# Patient Record
Sex: FEMALE | Employment: FULL TIME | URBAN - METROPOLITAN AREA
[De-identification: names, ages, dates, MRNs, and addresses within clinical notes are randomized per-mention and may not be internally consistent; named-entity substitution may affect disease eponyms.]

---

## 2024-01-18 DIAGNOSIS — Z00.00 HEALTH MAINTENANCE EXAMINATION: ICD-10-CM

## 2024-02-05 ENCOUNTER — APPOINTMENT (OUTPATIENT)
Dept: RADIOLOGY | Facility: HOSPITAL | Age: 39
End: 2024-02-05
Payer: COMMERCIAL

## 2024-02-05 ENCOUNTER — APPOINTMENT (OUTPATIENT)
Dept: PRIMARY CARE | Facility: CLINIC | Age: 39
End: 2024-02-05
Payer: COMMERCIAL

## 2024-02-05 ENCOUNTER — APPOINTMENT (OUTPATIENT)
Dept: VASCULAR MEDICINE | Facility: HOSPITAL | Age: 39
End: 2024-02-05
Payer: COMMERCIAL

## 2024-02-05 ENCOUNTER — APPOINTMENT (OUTPATIENT)
Dept: CARDIOLOGY | Facility: HOSPITAL | Age: 39
End: 2024-02-05
Payer: COMMERCIAL

## 2024-02-05 ENCOUNTER — APPOINTMENT (OUTPATIENT)
Dept: INTEGRATIVE MEDICINE | Facility: CLINIC | Age: 39
End: 2024-02-05
Payer: COMMERCIAL

## 2024-03-06 ENCOUNTER — NUTRITION (OUTPATIENT)
Dept: PRIMARY CARE | Facility: CLINIC | Age: 39
End: 2024-03-06
Payer: COMMERCIAL

## 2024-03-06 ENCOUNTER — HOSPITAL ENCOUNTER (OUTPATIENT)
Dept: VASCULAR MEDICINE | Facility: HOSPITAL | Age: 39
Discharge: HOME | End: 2024-03-06
Payer: COMMERCIAL

## 2024-03-06 ENCOUNTER — ALLIED HEALTH (OUTPATIENT)
Dept: INTEGRATIVE MEDICINE | Facility: CLINIC | Age: 39
End: 2024-03-06
Payer: COMMERCIAL

## 2024-03-06 ENCOUNTER — APPOINTMENT (OUTPATIENT)
Dept: RADIOLOGY | Facility: HOSPITAL | Age: 39
End: 2024-03-06
Payer: COMMERCIAL

## 2024-03-06 ENCOUNTER — HOSPITAL ENCOUNTER (OUTPATIENT)
Dept: CARDIOLOGY | Facility: HOSPITAL | Age: 39
Discharge: HOME | End: 2024-03-06
Payer: COMMERCIAL

## 2024-03-06 ENCOUNTER — OFFICE VISIT (OUTPATIENT)
Dept: PRIMARY CARE | Facility: CLINIC | Age: 39
End: 2024-03-06
Payer: COMMERCIAL

## 2024-03-06 VITALS — HEIGHT: 66 IN | WEIGHT: 141 LBS | BODY MASS INDEX: 22.66 KG/M2

## 2024-03-06 VITALS
OXYGEN SATURATION: 99 % | BODY MASS INDEX: 22.66 KG/M2 | HEART RATE: 64 BPM | HEIGHT: 66 IN | DIASTOLIC BLOOD PRESSURE: 66 MMHG | WEIGHT: 141 LBS | SYSTOLIC BLOOD PRESSURE: 104 MMHG

## 2024-03-06 DIAGNOSIS — Z00.00 HEALTHCARE MAINTENANCE: Primary | ICD-10-CM

## 2024-03-06 DIAGNOSIS — Z13.6 ENCOUNTER FOR SCREENING FOR CARDIOVASCULAR DISORDERS: ICD-10-CM

## 2024-03-06 DIAGNOSIS — Z00.00 HEALTH MAINTENANCE EXAMINATION: ICD-10-CM

## 2024-03-06 DIAGNOSIS — Z00.00 HEALTH MAINTENANCE EXAMINATION: Primary | ICD-10-CM

## 2024-03-06 LAB
25(OH)D3 SERPL-MCNC: 30 NG/ML (ref 30–100)
ALBUMIN SERPL BCP-MCNC: 4.4 G/DL (ref 3.4–5)
ALP SERPL-CCNC: 54 U/L (ref 33–110)
ALT SERPL W P-5'-P-CCNC: 9 U/L (ref 7–45)
ANION GAP SERPL CALC-SCNC: 13 MMOL/L (ref 10–20)
APPEARANCE UR: ABNORMAL
AST SERPL W P-5'-P-CCNC: 12 U/L (ref 9–39)
BACTERIA #/AREA URNS AUTO: ABNORMAL /HPF
BASOPHILS # BLD AUTO: 0.07 X10*3/UL (ref 0–0.1)
BASOPHILS NFR BLD AUTO: 0.9 %
BILIRUB SERPL-MCNC: 0.5 MG/DL (ref 0–1.2)
BILIRUB UR STRIP.AUTO-MCNC: NEGATIVE MG/DL
BUN SERPL-MCNC: 14 MG/DL (ref 6–23)
CALCIUM SERPL-MCNC: 8.6 MG/DL (ref 8.6–10.3)
CHLORIDE SERPL-SCNC: 104 MMOL/L (ref 98–107)
CHOLEST SERPL-MCNC: 168 MG/DL (ref 0–199)
CHOLESTEROL/HDL RATIO: 2.5
CO2 SERPL-SCNC: 24 MMOL/L (ref 21–32)
COLOR UR: YELLOW
CREAT SERPL-MCNC: 0.71 MG/DL (ref 0.5–1.05)
CRP SERPL HS-MCNC: 0.6 MG/L
EGFRCR SERPLBLD CKD-EPI 2021: >90 ML/MIN/1.73M*2
EOSINOPHIL # BLD AUTO: 0.48 X10*3/UL (ref 0–0.7)
EOSINOPHIL NFR BLD AUTO: 6.3 %
ERYTHROCYTE [DISTWIDTH] IN BLOOD BY AUTOMATED COUNT: 13.5 % (ref 11.5–14.5)
EST. AVERAGE GLUCOSE BLD GHB EST-MCNC: 94 MG/DL
FERRITIN SERPL-MCNC: 23 NG/ML (ref 8–150)
GLUCOSE SERPL-MCNC: 83 MG/DL (ref 74–99)
GLUCOSE UR STRIP.AUTO-MCNC: NEGATIVE MG/DL
HBA1C MFR BLD: 4.9 %
HCT VFR BLD AUTO: 42.7 % (ref 36–46)
HDLC SERPL-MCNC: 66.7 MG/DL
HGB BLD-MCNC: 14.2 G/DL (ref 12–16)
IMM GRANULOCYTES # BLD AUTO: 0.02 X10*3/UL (ref 0–0.7)
IMM GRANULOCYTES NFR BLD AUTO: 0.3 % (ref 0–0.9)
INSULIN P FAST SERPL-ACNC: 31 UIU/ML (ref 3–25)
IRON SATN MFR SERPL: 23 % (ref 25–45)
IRON SERPL-MCNC: 90 UG/DL (ref 35–150)
KETONES UR STRIP.AUTO-MCNC: NEGATIVE MG/DL
LDLC SERPL CALC-MCNC: 92 MG/DL
LEUKOCYTE ESTERASE UR QL STRIP.AUTO: NEGATIVE
LYMPHOCYTES # BLD AUTO: 2.39 X10*3/UL (ref 1.2–4.8)
LYMPHOCYTES NFR BLD AUTO: 31.6 %
MAGNESIUM SERPL-MCNC: 1.8 MG/DL (ref 1.6–2.4)
MCH RBC QN AUTO: 28.8 PG (ref 26–34)
MCHC RBC AUTO-ENTMCNC: 33.3 G/DL (ref 32–36)
MCV RBC AUTO: 87 FL (ref 80–100)
MONOCYTES # BLD AUTO: 0.37 X10*3/UL (ref 0.1–1)
MONOCYTES NFR BLD AUTO: 4.9 %
MUCOUS THREADS #/AREA URNS AUTO: ABNORMAL /LPF
NEUTROPHILS # BLD AUTO: 4.24 X10*3/UL (ref 1.2–7.7)
NEUTROPHILS NFR BLD AUTO: 56 %
NITRITE UR QL STRIP.AUTO: NEGATIVE
NON HDL CHOLESTEROL: 101 MG/DL (ref 0–149)
NRBC BLD-RTO: 0 /100 WBCS (ref 0–0)
PH UR STRIP.AUTO: 5 [PH]
PLATELET # BLD AUTO: 342 X10*3/UL (ref 150–450)
POC APPEARANCE, URINE: CLEAR
POC BILIRUBIN, URINE: NEGATIVE
POC BLOOD, URINE: ABNORMAL
POC COLOR, URINE: YELLOW
POC GLUCOSE, URINE: NEGATIVE MG/DL
POC KETONES, URINE: NEGATIVE MG/DL
POC LEUKOCYTES, URINE: NEGATIVE
POC NITRITE,URINE: NEGATIVE
POC PH, URINE: 6 PH
POC PROTEIN, URINE: NEGATIVE MG/DL
POC SPECIFIC GRAVITY, URINE: 1.01
POC UROBILINOGEN, URINE: 0.2 EU/DL
POTASSIUM SERPL-SCNC: 4.5 MMOL/L (ref 3.5–5.3)
PROT SERPL-MCNC: 7 G/DL (ref 6.4–8.2)
PROT UR STRIP.AUTO-MCNC: NEGATIVE MG/DL
RBC # BLD AUTO: 4.93 X10*6/UL (ref 4–5.2)
RBC # UR STRIP.AUTO: ABNORMAL /UL
RBC #/AREA URNS AUTO: ABNORMAL /HPF
SODIUM SERPL-SCNC: 136 MMOL/L (ref 136–145)
SP GR UR STRIP.AUTO: 1.01
SQUAMOUS #/AREA URNS AUTO: ABNORMAL /HPF
TIBC SERPL-MCNC: 396 UG/DL (ref 240–445)
TRIGL SERPL-MCNC: 46 MG/DL (ref 0–149)
TSH SERPL-ACNC: 1.4 MIU/L (ref 0.44–3.98)
UIBC SERPL-MCNC: 306 UG/DL (ref 110–370)
URATE SERPL-MCNC: 4.8 MG/DL (ref 2.3–6.7)
UROBILINOGEN UR STRIP.AUTO-MCNC: <2 MG/DL
VIT B12 SERPL-MCNC: 595 PG/ML (ref 211–911)
VLDL: 9 MG/DL (ref 0–40)
WBC # BLD AUTO: 7.6 X10*3/UL (ref 4.4–11.3)
WBC #/AREA URNS AUTO: ABNORMAL /HPF

## 2024-03-06 PROCEDURE — 93880 EXTRACRANIAL BILAT STUDY: CPT | Performed by: INTERNAL MEDICINE

## 2024-03-06 PROCEDURE — 83036 HEMOGLOBIN GLYCOSYLATED A1C: CPT

## 2024-03-06 PROCEDURE — BOMIX BODY MASS INDEX: Performed by: INTERNAL MEDICINE

## 2024-03-06 PROCEDURE — 82172 ASSAY OF APOLIPOPROTEIN: CPT

## 2024-03-06 PROCEDURE — 84443 ASSAY THYROID STIM HORMONE: CPT

## 2024-03-06 PROCEDURE — SMAR2 SMART-UHCSM: Performed by: PHYSICIAN ASSISTANT

## 2024-03-06 PROCEDURE — 1036F TOBACCO NON-USER: CPT | Performed by: INTERNAL MEDICINE

## 2024-03-06 PROCEDURE — 83525 ASSAY OF INSULIN: CPT

## 2024-03-06 PROCEDURE — 87086 URINE CULTURE/COLONY COUNT: CPT

## 2024-03-06 PROCEDURE — 84550 ASSAY OF BLOOD/URIC ACID: CPT

## 2024-03-06 PROCEDURE — 82607 VITAMIN B-12: CPT

## 2024-03-06 PROCEDURE — AUDIO AUDIO HEARING TEST: Performed by: INTERNAL MEDICINE

## 2024-03-06 PROCEDURE — 81001 URINALYSIS AUTO W/SCOPE: CPT

## 2024-03-06 PROCEDURE — 80061 LIPID PANEL: CPT

## 2024-03-06 PROCEDURE — 36415 COLL VENOUS BLD VENIPUNCTURE: CPT

## 2024-03-06 PROCEDURE — 83735 ASSAY OF MAGNESIUM: CPT

## 2024-03-06 PROCEDURE — 93017 CV STRESS TEST TRACING ONLY: CPT

## 2024-03-06 PROCEDURE — 80053 COMPREHEN METABOLIC PANEL: CPT

## 2024-03-06 PROCEDURE — 93978 VASCULAR STUDY: CPT | Performed by: INTERNAL MEDICINE

## 2024-03-06 PROCEDURE — 83550 IRON BINDING TEST: CPT

## 2024-03-06 PROCEDURE — NUTCO NUTRITION CONSULTATION: Performed by: DIETITIAN, REGISTERED

## 2024-03-06 PROCEDURE — 82728 ASSAY OF FERRITIN: CPT

## 2024-03-06 PROCEDURE — 93016 CV STRESS TEST SUPVJ ONLY: CPT | Performed by: INTERNAL MEDICINE

## 2024-03-06 PROCEDURE — 93018 CV STRESS TEST I&R ONLY: CPT | Performed by: INTERNAL MEDICINE

## 2024-03-06 PROCEDURE — 93978 VASCULAR STUDY: CPT

## 2024-03-06 PROCEDURE — BODTM BONE DENSITY TESTING/MEDISCAN: Performed by: INTERNAL MEDICINE

## 2024-03-06 PROCEDURE — 86141 C-REACTIVE PROTEIN HS: CPT

## 2024-03-06 PROCEDURE — 85025 COMPLETE CBC W/AUTO DIFF WBC: CPT

## 2024-03-06 PROCEDURE — 93880 EXTRACRANIAL BILAT STUDY: CPT

## 2024-03-06 PROCEDURE — 82306 VITAMIN D 25 HYDROXY: CPT

## 2024-03-06 PROCEDURE — 81002 URINALYSIS NONAUTO W/O SCOPE: CPT | Performed by: INTERNAL MEDICINE

## 2024-03-06 PROCEDURE — EXAM4 EXAM 4: Performed by: INTERNAL MEDICINE

## 2024-03-06 PROCEDURE — 83540 ASSAY OF IRON: CPT

## 2024-03-06 RX ORDER — LEVOTHYROXINE SODIUM 25 UG/1
25 TABLET ORAL DAILY
COMMUNITY

## 2024-03-06 NOTE — PROGRESS NOTES
39 y/o presents to [facility] for stress management and resilience training in coordination with  Executive Health. Lives with , [place], and two children (2, 4).    Duties/Schedule: / at [employer].  is also  and very demanding job. Works 40 hours. Hybrid, two days in office. PM- time with kids, make dinner. Weekends- kids' activities. No travel. Hobbies- (pre-kids) nature, yoga, reading, travel, Bible study, Yarsanism weekly.     Time for self-care: quality time kids; not much time to herself    Known stressors:   Poor sleep  Demanding job  Postpartum anxiety- worry about children and decisions related to them  Other long term health concerns    Stress in the body:   Mind racing  Sleep disruption     Sleep quality: Averages 5-6 hours per night. Sleep onset- quickly. Sleep maintenance- disrupted by 2 y/o, breastfeeding, co-sleeping. Prefers this and knows it is temporary.    - Caffeine- 6 oz latte, occ iced tea  - ETOH- rare  - Water- 40-50 oz  - Late night eating- rare    Current stress management strategies: prayer, nature, social connection     Discussed physiologic changes that occur with acute vs. chronic stress, the autonomic nervous system, evidence re: neuroplasticity of mindfulness, and different mindfulness practices. Also discussed lifestyle habits that support the Body's natural defense mechanisms.  - familiar with yoga     Practiced a mindful breathing meditation together- feels more at ease; prefers counting    Plan:   SEE PATIENT INSTRUCTIONS

## 2024-03-06 NOTE — PROGRESS NOTES
Executive Physical         Patient ID: Valerie Ramirez is a 38 y.o. female who presents for Executive Health examination    The following report is in reference to your examination which was held at [facility] on 03/08/24. First, let me state that it was a pleasure meeting with you and  we appreciate that you have chosen [facility] for your executive evaluation.    You  a very healthy 38-year-old woman.  Past medical history significant for mostly gynecologic issues more unexplained infertility issues than anything else years   it took 3 years to become pregnant  6 IUI procedures but then became pregnant on your own  Second pregnancy did not take as long perhaps about 1 year  Recent miscarriage at around 8 weeks  Gestational diabetes with both pregnancies requiring insulin with the second pregnancy  No hospitalizations other than gynecologic related hospitalizations  Subclinical hypothyroidism on low-dose Synthroid which was started with infertility issues        At the time of your evaluation your biggest health concerns were  With recent miscarriage  History of gestational diabetes and implications for health issues going forward  Stress  High heart rate on occasion        History reviewed. No pertinent past medical history.      There is no problem list on file for this patient.       History reviewed. No pertinent surgical history.     Family History   Problem Relation Name Age of Onset    No Known Problems Mother      No Known Problems Father      No Known Problems Sister      No Known Problems Sister      Hypertension Brother      Coronary artery disease Maternal Grandmother      Stroke Maternal Grandfather  62    Breast cancer Paternal Grandmother  90    Lung cancer Paternal Grandfather          smoker        Social History     Social History Narrative    Originally from [place] area.  Went to [employer] for undergrad and then went to law  "school    Moved to [place] about 8 years ago with her job as  for [employer]     to her  who is also an  and from [place] who also does corporate law    They have 2 children a son who is 3 a daughter who is 1    Her diet is pretty healthy    She does not exercise routinely but does have an active life    She has never been a smoker    Rare alcohol    Her sleep patterns are not great as her children do not sleep well and she knows she does not get enough sleep    Increased stress with job children etc. but feels she does okay        No Known Allergies       Current Outpatient Medications:     Synthroid 25 mcg tablet, Take 1 tablet (25 mcg) by mouth once daily., Disp: , Rfl:      Review of systems was negative other than that listed in present history    Visit Vitals  /66   Pulse 64   Ht 1.676 m (5' 6\")   Wt 64 kg (141 lb)   SpO2 99%   BMI 22.76 kg/m²   Smoking Status Never   BSA 1.73 m²        Physical Exam  \Physical examination  Reveals a well-developed young woman in no acute distress    appearance is age appropriate  HEENT exam  Extraocular motion is intact  Tympanic membranes and external auditory canals are normal  Oropharynx is normal  There is no cervical lymphadenopathy appreciated  The thyroid is within normal limits    Lungs    clear to auscultation and percussion    Cardiovascular   regular rate and rhythm  No murmurs rubs or gallops are appreciated    Breast examination   No dominant masses nipple discharge or axillary lymphadenopathy is appreciated    Abdomen   soft nontender bowel sounds are positive   there is no organomegaly noted      Periphery  Pulses are present without deficits noted  No peripheral edema is noted    Musculoskeletal  Gait is normal  Is no joint erythema or swelling noted  Range of motion is within normal limits  Strength is 5 of 5 without deficits noted    Dermatology  No concerning skin lesions are noted    Neurology  No deficits are " noted  Judgment appears appropriate  Mood and affect are appropriate       No results found for this or any previous visit (from the past 24 hour(s)).   Office Visit on 03/06/2024   Component Date Value Ref Range Status    WBC 03/06/2024 7.6  4.4 - 11.3 x10*3/uL Final    nRBC 03/06/2024 0.0  0.0 - 0.0 /100 WBCs Final    RBC 03/06/2024 4.93  4.00 - 5.20 x10*6/uL Final    Hemoglobin 03/06/2024 14.2  12.0 - 16.0 g/dL Final    Hematocrit 03/06/2024 42.7  36.0 - 46.0 % Final    MCV 03/06/2024 87  80 - 100 fL Final    MCH 03/06/2024 28.8  26.0 - 34.0 pg Final    MCHC 03/06/2024 33.3  32.0 - 36.0 g/dL Final    RDW 03/06/2024 13.5  11.5 - 14.5 % Final    Platelets 03/06/2024 342  150 - 450 x10*3/uL Final    Neutrophils % 03/06/2024 56.0  40.0 - 80.0 % Final    Immature Granulocytes %, Automated 03/06/2024 0.3  0.0 - 0.9 % Final    Immature Granulocyte Count (IG) includes promyelocytes, myelocytes and metamyelocytes but does not include bands. Percent differential counts (%) should be interpreted in the context of the absolute cell counts (cells/UL).    Lymphocytes % 03/06/2024 31.6  13.0 - 44.0 % Final    Monocytes % 03/06/2024 4.9  2.0 - 10.0 % Final    Eosinophils % 03/06/2024 6.3  0.0 - 6.0 % Final    Basophils % 03/06/2024 0.9  0.0 - 2.0 % Final    Neutrophils Absolute 03/06/2024 4.24  1.20 - 7.70 x10*3/uL Final    Percent differential counts (%) should be interpreted in the context of the absolute cell counts (cells/uL).    Immature Granulocytes Absolute, Au* 03/06/2024 0.02  0.00 - 0.70 x10*3/uL Final    Lymphocytes Absolute 03/06/2024 2.39  1.20 - 4.80 x10*3/uL Final    Monocytes Absolute 03/06/2024 0.37  0.10 - 1.00 x10*3/uL Final    Eosinophils Absolute 03/06/2024 0.48  0.00 - 0.70 x10*3/uL Final    Basophils Absolute 03/06/2024 0.07  0.00 - 0.10 x10*3/uL Final    Magnesium 03/06/2024 1.80  1.60 - 2.40 mg/dL Final    Thyroid Stimulating Hormone 03/06/2024 1.40  0.44 - 3.98 mIU/L Final    Uric Acid 03/06/2024 4.8   2.3 - 6.7 mg/dL Final    Venipuncture immediately after or during the administration of Metamizole may lead to falsely low results. Testing should be performed immediately  prior to Metamizole dosing.    Vitamin B12 03/06/2024 595  211 - 911 pg/mL Final    Vitamin D, 25-Hydroxy, Total 03/06/2024 30  30 - 100 ng/mL Final    Glucose 03/06/2024 83  74 - 99 mg/dL Final    Sodium 03/06/2024 136  136 - 145 mmol/L Final    Potassium 03/06/2024 4.5  3.5 - 5.3 mmol/L Final    Chloride 03/06/2024 104  98 - 107 mmol/L Final    Bicarbonate 03/06/2024 24  21 - 32 mmol/L Final    Anion Gap 03/06/2024 13  10 - 20 mmol/L Final    Urea Nitrogen 03/06/2024 14  6 - 23 mg/dL Final    Creatinine 03/06/2024 0.71  0.50 - 1.05 mg/dL Final    eGFR 03/06/2024 >90  >60 mL/min/1.73m*2 Final    Calculations of estimated GFR are performed using the 2021 CKD-EPI Study Refit equation without the race variable for the IDMS-Traceable creatinine methods.  https://jasn.asnjournals.org/content/early/2021/09/22/ASN.3800831208    Calcium 03/06/2024 8.6  8.6 - 10.3 mg/dL Final    Albumin 03/06/2024 4.4  3.4 - 5.0 g/dL Final    Alkaline Phosphatase 03/06/2024 54  33 - 110 U/L Final    Total Protein 03/06/2024 7.0  6.4 - 8.2 g/dL Final    AST 03/06/2024 12  9 - 39 U/L Final    Bilirubin, Total 03/06/2024 0.5  0.0 - 1.2 mg/dL Final    ALT 03/06/2024 9  7 - 45 U/L Final    Patients treated with Sulfasalazine may generate falsely decreased results for ALT.    CRP, High Sensitivity 03/06/2024 0.6  <1.0 mg/L Final    Ferritin 03/06/2024 23  8 - 150 ng/mL Final    Hemoglobin A1C 03/06/2024 4.9  see below % Final    Estimated Average Glucose 03/06/2024 94  Not Established mg/dL Final    Insulin, Fasting 03/06/2024 31 (H)  3 - 25 uIU/mL Final    Iron 03/06/2024 90  35 - 150 ug/dL Final    UIBC 03/06/2024 306  110 - 370 ug/dL Final    TIBC 03/06/2024 396  240 - 445 ug/dL Final    % Saturation 03/06/2024 23 (L)  25 - 45 % Final    Cholesterol 03/06/2024 168  0 - 199  mg/dL Final          Age      Desirable   Borderline High   High     0-19 Y     0 - 169       170 - 199     >/= 200    20-24 Y     0 - 189       190 - 224     >/= 225         >24 Y     0 - 199       200 - 239     >/= 240   **All ranges are based on fasting samples. Specific   therapeutic targets will vary based on patient-specific   cardiac risk.    Pediatric guidelines reference:Pediatrics 2011, 128(S5).Adult guidelines reference: NCEP ATPIII Guidelines,LINDA 2001, 258:2486-97    Venipuncture immediately after or during the administration of Metamizole may lead to falsely low results. Testing should be performed immediately prior to Metamizole dosing.    HDL-Cholesterol 03/06/2024 66.7  mg/dL Final      Age       Very Low   Low     Normal    High    0-19 Y    < 35      < 40     40-45     ----  20-24 Y    ----     < 40      >45      ----        >24 Y      ----     < 40     40-60      >60      Cholesterol/HDL Ratio 03/06/2024 2.5   Final      Ref Values  Desirable  < 3.4  High Risk  > 5.0    LDL Calculated 03/06/2024 92  <=99 mg/dL Final                                Near   Borderline      AGE      Desirable  Optimal    High     High     Very High     0-19 Y     0 - 109     ---    110-129   >/= 130     ----    20-24 Y     0 - 119     ---    120-159   >/= 160     ----      >24 Y     0 -  99   100-129  130-159   160-189     >/=190      VLDL 03/06/2024 9  0 - 40 mg/dL Final    Triglycerides 03/06/2024 46  0 - 149 mg/dL Final       Age         Desirable   Borderline High   High     Very High   0 D-90 D    19 - 174         ----         ----        ----  91 D- 9 Y     0 -  74        75 -  99     >/= 100      ----    10-19 Y     0 -  89        90 - 129     >/= 130      ----    20-24 Y     0 - 114       115 - 149     >/= 150      ----         >24 Y     0 - 149       150 - 199    200- 499    >/= 500    Venipuncture immediately after or during the administration of Metamizole may lead to falsely low results. Testing should be  performed immediately prior to Metamizole dosing.    Non HDL Cholesterol 03/06/2024 101  0 - 149 mg/dL Final          Age       Desirable   Borderline High   High     Very High     0-19 Y     0 - 119       120 - 144     >/= 145    >/= 160    20-24 Y     0 - 149       150 - 189     >/= 190      ----         >24 Y    30 mg/dL above LDL Cholesterol goal      Lipoprotein (a) 03/06/2024 <6  <=29 mg/dL Final    Performed By: [facility]  [address] [place] [id number]  : Jorge Alberto Nuñez MD, PhD  CLIA Number: [id number]    POC Color, Urine 03/06/2024 Yellow  Straw, Yellow, Light-Yellow Final    POC Appearance, Urine 03/06/2024 Clear  Clear Final    POC Glucose, Urine 03/06/2024 NEGATIVE  NEGATIVE mg/dl Final    POC Bilirubin, Urine 03/06/2024 NEGATIVE  NEGATIVE Final    POC Ketones, Urine 03/06/2024 NEGATIVE  NEGATIVE mg/dl Final    POC Specific Gravity, Urine 03/06/2024 1.015  1.005 - 1.035 Final    POC Blood, Urine 03/06/2024 TRACE-Lysed (A)  NEGATIVE Final    POC PH, Urine 03/06/2024 6.0  No Reference Range Established PH Final    POC Protein, Urine 03/06/2024 NEGATIVE  NEGATIVE, 30 (1+) mg/dl Final    POC Urobilinogen, Urine 03/06/2024 0.2  0.2, 1.0 EU/DL Final    Poc Nitrite, Urine 03/06/2024 NEGATIVE  NEGATIVE Final    POC Leukocytes, Urine 03/06/2024 NEGATIVE  NEGATIVE Final    Urine Culture 03/06/2024 No significant growth   Final    Color, Urine 03/06/2024 Yellow  Straw, Yellow Final    Appearance, Urine 03/06/2024 Hazy (N)  Clear Final    Specific Gravity, Urine 03/06/2024 1.015  1.005 - 1.035 Final    pH, Urine 03/06/2024 5.0  5.0, 5.5, 6.0, 6.5, 7.0, 7.5, 8.0 Final    Protein, Urine 03/06/2024 NEGATIVE  NEGATIVE mg/dL Final    Glucose, Urine 03/06/2024 NEGATIVE  NEGATIVE mg/dL Final    Blood, Urine 03/06/2024 SMALL (1+) (A)  NEGATIVE Final    Ketones, Urine 03/06/2024 NEGATIVE  NEGATIVE mg/dL Final    Bilirubin, Urine 03/06/2024 NEGATIVE  NEGATIVE Final    Urobilinogen,  Urine 03/06/2024 <2.0  <2.0 mg/dL Final    Nitrite, Urine 03/06/2024 NEGATIVE  NEGATIVE Final    Leukocyte Esterase, Urine 03/06/2024 NEGATIVE  NEGATIVE Final    WBC, Urine 03/06/2024 1-5  1-5, NONE /HPF Final    RBC, Urine 03/06/2024 NONE  NONE, 1-2, 3-5 /HPF Final    Squamous Epithelial Cells, Urine 03/06/2024 1-9 (SPARSE)  Reference range not established. /HPF Final    Bacteria, Urine 03/06/2024 1+ (A)  NONE SEEN /HPF Final    Mucus, Urine 03/06/2024 1+  Reference range not established. /LPF Final       No results found.     Bone Density  normal  Hearing screen normal             Vascular studies   Carotid doppler ultrasound findings are consistent with less than 50% narrowing of the right and left carotid artery arteries which is normal  There is no evidence for abdominal aortic aneurysm         Stress test normal        Assessment/Plan       Discussion/Summary  In summary     Notable lab work     Total cholesterol 168 desired range less than 200  HDL which is the good cholesterol  66.7  LDL    92 desired range less than 100  Triglycerides  46 with desired range less than 150    Percent saturation was slightly low at 23% showing perhaps a relatively low body store of iron however your actual iron level was normal and you are not anemic  Hemoglobin A1c was excellent at 4.9  Although it looks like fasting insulin level is high 31 (3-25)  this however was a normal  fasting insulin level and we know you were not fasting so I think it is reflective of that    The remainder of blood work was all acceptable              Recommendations     It was a pleasure to meet you.  I think you are extraordinarily healthy and doing a good job with your overall health care  I know one of the your biggest issues is your history of gestational diabetes and perhaps risk of diabetes later in life.  Continue to follow routinely with your endocrinologist as indicated  You may want to look into a book titled  Outlive by Samuel  Cayla    Increased routine regular exercise is recommended. American Cardiology Association recommended 100-120 mins weekly of aerobic exercise (exercise that increases your heart rate). In addition you should add resistance training (lifting weights/etc.).   The biggest challenge for you is finding the time to get this in with your busy schedule    Stress management follow guidelines set up by our stress management team              Health maintenance    Tdap or tetanus booster every 10 years  Shingrix or shingles vaccination at age 50.  This is a series of 2 vaccinations the second vaccination is given 2 to 6 months following the first  Pneumonia vaccination with Prevnar 20 at age 65  Colon cancer screening starting at age 45 colonoscopy or if no risks could then consider Cologuard as well    Women  Yearly mammograms starting at age 40  Pap smears every 3 to 5 years  until age 65      General Wellness Tips: Please continue with a balanced diet and a regular physical activity program for at least 150 minutes/week of moderate exercise and 30 minutes/week of resistance/weight training per week.  Try to get 6 to 8 hours of sleep per night.  Please download the calm or [employer] cheryl from the Cheryl Store to assist with stress and sleep management if necessary.      I hope you found your day to be worthwhile and informative and I look forward to our continued relationship.  In conclusion,  I wish to thank you for attending the  executive health program at [facility].  I wish you and your family a safe and healthy year.    Please email me at [email or web address] or call me at [phone] if you have any questions pertaining to this report or any other medical concerns.    In good health,    Марина Vincent        This note was partially generated using the Dragon voice recognition system.  There may be some incorrect wording ,grammar, spelling or punctuation errors that were not corrected prior to  committing the note to the medical record.

## 2024-03-06 NOTE — PROGRESS NOTES
"Nutrition Assessment     Reason for Visit: It was a pleasure meeting Ms. Ramirez today to discuss diet and nutrition as part of her initial executive physical.    Anthropometrics:  Anthropometrics  Height: 167.6 cm (5' 6\")  Weight: 64 kg (141 lb)  BMI (Calculated): 22.77       Food And Nutrient Intake:  Food and Nutrient History  Food and Nutrient History: She reports a history a hypoglycemia.  She had gestational diabetes with all of her pregnancies.  She reports eating 2 meals a day.  She does not typically eat breakfast.  She tends to feel nauseous in the morning.  She will eat something small such as a snack size Perfect bar.  When she is in the office 2-3 days a week she will get [employer] meals.  She is eating out 5-6 times a week.  She is eating fish once a week.  Fluid Intake: Water-40-50oz a day; coffee-6 oz latte with whole milk; iced tea-on occasion; alcohol-rare, maybe once a month  Food Allergy: None  Food Intolerance: None  GI Symptoms: constipation (In her early adulthood she experienced constipation and sometimes would only have 2 bowel movements a week.  Now she tends to be pretty regular with her bowel movements.)  Sleep Duration/Quality: 5-6 hrs disrupted (She has a 1 year old that is not a good sleeper)     Supplements: None    Food Recall  9:00am: whole milk latte, Mini Perfect Bar (120 calories, 6g protein)  12:00pm: restaurant-steak sandwich, french fries  6:00pm: fruit plate (berries, grapes, cantalope); cheese sandwich; 100 calorie bag of popcorn; Mini Perfect bar    9:00am: whole milk latte  12:00pm: Chipotle burrito made with fajita veggies, cheese, brown rice and chicken  6:00pm: Eggplant fries with tomato sauce, 1 cup pasta with olive oil    9:00am: raisin toast  11:00am: egg, yañez, and cheese on everything bagel  1:00pm: rice, chicken and broccoli in bone broth  6:00pm: 1 cup pasta with olive oil, handful of cantalope chunks      Physical Activities:  Physical Activity  Physical " "Activity History: Before she had kids she was regularly doing cardio (lower impact) and yoga  Type of Physical Activity: She is not doing any planned/structured exercise; she tries to stay active with her young kids (walks, lord, etc)      Energy Needs  Calculated Energy Needs Using Equations  Height: 167.6 cm (5' 6\")  Weight Used for Equation Calculations: 64 kg (141 lb)  [facility] Equation (Overweight or Obese Patients): 1341  Equation Chosen to Use by RD: [facility]  Activity Factor: 1.3  Total Energy Needs: 1746  Protein Needs: 112g/day (0.8g/lb)      Nutrition Diagnosis      Nutrition Diagnosis  Patient has Nutrition Diagnosis: Yes  Diagnosis Status (1): New  Nutrition Diagnosis 1: Inadequate protein intake  Related to (1): food- and nutrition-related knowledge deficit regarding appropriate protein intake  As Evidenced by (1): food recall showing she is not meeting the recommended 112g protein per day  Additional Nutrition Diagnosis: Diagnosis 2  Diagnosis Status (2): New  Nutrition Diagnosis 2: Inadequate fiber intake  Related to (2): food- and nutrition-related knowledge deficit regarding appropriate fiber intake  As Evidenced by (2): food recall showing she is not meeting the recommended 30g fiber per day    Nutrition Interventions/Recommendations   Food and Nutrition Delivery  Meals & Snacks: Fiber-modified diet, Protein-modified diet        Patient Instructions   1) To help create well balanced meals while being mindful of portion sizes, use the plate model for portioning out your meals.  Fill 1/2 of your plate with non-starchy vegetables, 1/4 of your plate with lean protein (~4-5oz per meal), and 1/4 of your plate with complex carbohydrates/whole grains.  Each meal should contain the following 3 components: protein + fiber + healthy fats.    2) Aim for 30g fiber daily.  One way to help you reach this goal is to include non-starchy vegetables with both lunch and dinner (at least 1-2 cups).  " Be sure to include a wide variety of colorful vegetables throughout the week.  Also, be sure to use 100% whole wheat/whole grain breads/pastas, brown/wild rice, quinoa, oats, beans, lentils, starchy vegetables-potatoes, sweet potatoes, corn, peas, winter squash and limit/avoid white, processed carbohydrates (white bread, white pasta, white rice, etc).    3) Choose 3 out of 5 of the following daily to help you reach your daily fiber goals:  -1 cup berries (4-5g fiber)  -1/2 cup beans (7g fiber)  -1/4 cup nuts (5g fiber)  -1/3 avocado (4g fiber)  -3 cups greens (5g fiber)    4) Ensure you are getting adequate amounts of protein consistently throughout the day.  Your daily protein goal is 112g.  Aim for 30-40g per meal and include 10-15g protein at snacks.    5) Begin to incorporate breakfast regularly which will help with hypoglycemic episodes as well as nausea.  Options for increasing protein and fiber at breakfast:  -[employer] Pro Elite pre-made protein drink (32g protein per bottle) + fruit + nuts  -protein bars such as Aloha or No Cow  -1 cup cottage cheese (25g protein) + fruit  -Greek yogurt + collagen powder + high fiber cereal + nuts/seeds  -2-3 eggs + chicken sausage    6) Recommended supplements:  -Prenatal multivitamin  -Vitamin D3, 2000-4000IUs/day.  Take with food.  -Omega-3 supplement, [employer]atur[employer] Pro Omega 2000, 2 capsules a day  -Pure Encapsulations magnesium citrate (to help alleviate constipation when it occurs).  Start with 2 capsules and can titrate up to 3-4 capsules at bedtime    Nutrition Monitoring and Evaluation   Food/Nutrient Related History Monitoring  Monitoring and Evaluation Plan: Meal/snack pattern, Protein intake, Fiber intake  Meal/Snack Pattern: Estimated meal and snack pattern  Criteria: Eat 3 meals a day (incorporate breakfast consistently)  Estimated protein intake: Estimated protein intake  Criteria: 112g protein per day  Estimated fiber intake: Estimated fiber intake  Criteria:  30g fiber per day

## 2024-03-06 NOTE — PATIENT INSTRUCTIONS
1) To help create well balanced meals while being mindful of portion sizes, use the plate model for portioning out your meals.  Fill 1/2 of your plate with non-starchy vegetables, 1/4 of your plate with lean protein (~4-5oz per meal), and 1/4 of your plate with complex carbohydrates/whole grains.  Each meal should contain the following 3 components: protein + fiber + healthy fats.    2) Aim for 30g fiber daily.  One way to help you reach this goal is to include non-starchy vegetables with both lunch and dinner (at least 1-2 cups).  Be sure to include a wide variety of colorful vegetables throughout the week.  Also, be sure to use 100% whole wheat/whole grain breads/pastas, brown/wild rice, quinoa, oats, beans, lentils, starchy vegetables-potatoes, sweet potatoes, corn, peas, winter squash and limit/avoid white, processed carbohydrates (white bread, white pasta, white rice, etc).    3) Choose 3 out of 5 of the following daily to help you reach your daily fiber goals:  -1 cup berries (4-5g fiber)  -1/2 cup beans (7g fiber)  -1/4 cup nuts (5g fiber)  -1/3 avocado (4g fiber)  -3 cups greens (5g fiber)    4) Ensure you are getting adequate amounts of protein consistently throughout the day.  Your daily protein goal is 112g.  Aim for 30-40g per meal and include 10-15g protein at snacks.    5) Begin to incorporate breakfast regularly which will help with hypoglycemic episodes as well as nausea.  Options for increasing protein and fiber at breakfast:  -OWYN Pro Elite pre-made protein drink (32g protein per bottle) + fruit + nuts  -protein bars such as Aloha or No Cow  -1 cup cottage cheese (25g protein) + fruit  -Greek yogurt + collagen powder + high fiber cereal + nuts/seeds  -2-3 eggs + chicken sausage    6) Recommended supplements:  -Prenatal multivitamin  -Vitamin D3, 2000-4000IUs/day.  Take with food.  -Omega-3 supplement, Nordic Naturals Pro Omega 2000, 2 capsules a day  -Pure Encapsulations magnesium citrate (to help  alleviate constipation when it occurs).  Start with 2 capsules and can titrate up to 3-4 capsules at bedtime

## 2024-03-07 LAB — BACTERIA UR CULT: NORMAL

## 2024-03-07 NOTE — PATIENT INSTRUCTIONS
Stress management:  Spend 10 intentional minutes in nature (phone free!). While outside, name:  5 things you see  4 things you hear  3 things you smell  2 things you feel  1 thing you taste  Use mindful breathing before lunch daily  4-7-8 breath  Affirmations  Gratitude practice  State or write down three things that made you feel grateful throughout the day  Consider using a Hoberman's Sphere to teach your kids breath work  https://www.SchemaLogicube.com/watch?v=CJO0Kt8nsxY&t=6s   Supplements: (account made on Fullscript)  Prenatals- We- or Ritual are good brands  To reduce anxiety, increase intake or supplement with:  Magnesium  Pure Encapsulations Magnesium glycinate 120 mg nightly  Magnesium-rich foods: beans, nuts, seeds, avocado  Omega 3  Pure Encapsulations EPA/DHA Essentials 2 capsules daily  Omega 3 Foods: wild caught sockeye salmon, sardines, mackerel, sandy seeds, ground flax seed, walnuts, pecans  Resources:  Sleep hygiene and Mindfulness handouts  Fertility podcast- https://Neighborland/blog//podcast-ep862/   Endocrine disrupting chemicals- https://www.endocrine.org/-/media/endocrine/files/patient-engagement/hormones-and-series/hormones_and_edcs_what_you_need_to_know.pdf   Water filter- https://Zetta.net/products/the-Kasenna-showerhead?xpombiz=75249287041737

## 2024-03-08 LAB — LPA SERPL-MCNC: <6 MG/DL

## 2025-03-17 NOTE — PROGRESS NOTES
Executive Physical         Patient ID: Valerie Ramirez is a 40 y.o. female who presents for Executive Health examination    The following report is in reference to your examination which was held at [facility] on 03/21/25. First, let me state that it was a pleasure meeting with you and  we appreciate that you have chosen [facility] for your executive evaluation.    Past medical history significant for mostly gynecologic issues more unexplained infertility issues than anything else years   it took 3 years to become pregnant  6 IUI procedures but then became pregnant on your own  Second pregnancy did not take as long perhaps about 1 year   2 miscarriages at around 8 weeks in  2023 and 2024 (this one was with twins)  He would like to conceive again and currently they are not using any contraception she thinks after the age of 41 she may consider contraception  These miscarriages have been very disheartening for her she is seeing a counselor  Gestational diabetes with both pregnancies requiring insulin with the second pregnancy  No hospitalizations other than gynecologic related hospitalizations  Subclinical hypothyroidism on low-dose Synthroid which was started with infertility issues  Sciatica she has been bothered with this for some time seems to be worse with sitting especially driving she is not really doing any stretching I has been many years but is little worse right now      He feels they have had a lot of viral illnesses this winter and in the last month or 2 she has had some strange feeling of feel like her face is warm frequently and then will feel chilled as well not a lot of other respiratory symptoms  Her sleep patterns remain pretty poor as her daughter really does not sleep well at all    At the time of your evaluation your biggest health concerns were  Sciatica  Strange feeling of heat cold intolerance        No past medical history on file.   "    There is no problem list on file for this patient.       No past surgical history on file.     Family History   Problem Relation Name Age of Onset    No Known Problems Mother      Hypertension Father      Hyperlipidemia Father      No Known Problems Sister      No Known Problems Sister      Hypertension Brother      Coronary artery disease Maternal Grandmother      Stroke Maternal Grandfather  62    Breast cancer Paternal Grandmother  90    Lung cancer Paternal Grandfather          smoker        Social History     Social History Narrative    Originally from [place] area.  Went to [employer] for undergrad and then went to [employer]    Moved to [place] about  2016 vago with her job as  for [employer]     to her  who is also an  and from [place] who also does corporate law    They have 2 children a son who is 4 a daughter who is 2     Her diet is pretty healthy    She does not exercise routinely but does have an active life    She has never been a smoker    Rare alcohol    Her sleep patterns are not great as her children do not sleep well and she knows she does not get enough sleep        Increased stress with job children etc. but feels she does okay a        No Known Allergies       Current Outpatient Medications:     Synthroid 25 mcg tablet, Take 1 tablet (25 mcg) by mouth once daily., Disp: , Rfl:      Review of systems was negative other than that listed in present history    Visit Vitals  /68   Pulse 88   Ht 1.664 m (5' 5.5\")   Wt 61.2 kg (135 lb)   SpO2 99%   BMI 22.12 kg/m²   Smoking Status Never   BSA 1.68 m²    InBody scan  Weight 137.4  BMI 22.5  Percent body fat 26.9  Visceral fat 7    Physical Exam  Physical examination  Reveals a well-developed woman age-appropriate she is thin in no acute distress    appearance is age-appropriate she is thin  HEENT exam  Extraocular motion is intact  Tympanic membranes and external auditory " canals are normal  Oropharynx is normal  There is no cervical lymphadenopathy appreciated  The thyroid is within normal limits    Lungs    clear to auscultation and percussion    Cardiovascular   regular rate and rhythm  No murmurs rubs or gallops are appreciated    Breast examination   No dominant masses nipple discharge or axillary lymphadenopathy is appreciated    Abdomen   soft nontender bowel sounds are positive   there is no organomegaly noted      Periphery  Pulses are present without deficits noted  No peripheral edema is noted    Musculoskeletal  Gait is normal  Is no joint erythema or swelling noted  Range of motion is within normal limits  Strength is 5 of 5 without deficits noted    Dermatology  No concerning skin lesions are noted    Neurology  No deficits are noted  Judgment appears appropriate  Mood and affect are appropriate         No results found for this or any previous visit (from the past 24 hours).     Nutrition on 03/19/2025   Component Date Value Ref Range Status    POC Color, Urine 03/19/2025 Yellow  Straw, Yellow, Light-Yellow Final    POC Appearance, Urine 03/19/2025 Clear  Clear Final    POC Glucose, Urine 03/19/2025 NEGATIVE  NEGATIVE mg/dl Final    POC Bilirubin, Urine 03/19/2025 NEGATIVE  NEGATIVE Final    POC Ketones, Urine 03/19/2025 NEGATIVE  NEGATIVE mg/dl Final    POC Specific Gravity, Urine 03/19/2025 <=1.005  1.005 - 1.035 Final    POC Blood, Urine 03/19/2025 NEGATIVE  NEGATIVE Final    POC PH, Urine 03/19/2025 6.5  No Reference Range Established PH Final    POC Protein, Urine 03/19/2025 NEGATIVE  NEGATIVE mg/dl Final    POC Urobilinogen, Urine 03/19/2025 0.2  0.2, 1.0 EU/DL Final    Poc Nitrite, Urine 03/19/2025 NEGATIVE  NEGATIVE Final    POC Leukocytes, Urine 03/19/2025 NEGATIVE  NEGATIVE Final   Office Visit on 03/19/2025   Component Date Value Ref Range Status    WBC 03/19/2025 6.4  4.4 - 11.3 x10*3/uL Final    nRBC 03/19/2025 0.0  0.0 - 0.0 /100 WBCs Final    RBC  03/19/2025 4.86  4.00 - 5.20 x10*6/uL Final    Hemoglobin 03/19/2025 14.1  12.0 - 16.0 g/dL Final    Hematocrit 03/19/2025 41.1  36.0 - 46.0 % Final    MCV 03/19/2025 85  80 - 100 fL Final    MCH 03/19/2025 29.0  26.0 - 34.0 pg Final    MCHC 03/19/2025 34.3  32.0 - 36.0 g/dL Final    RDW 03/19/2025 13.8  11.5 - 14.5 % Final    Platelets 03/19/2025 316  150 - 450 x10*3/uL Final    Neutrophils % 03/19/2025 57.5  40.0 - 80.0 % Final    Immature Granulocytes %, Automated 03/19/2025 0.3  0.0 - 0.9 % Final    Immature Granulocyte Count (IG) includes promyelocytes, myelocytes and metamyelocytes but does not include bands. Percent differential counts (%) should be interpreted in the context of the absolute cell counts (cells/UL).    Lymphocytes % 03/19/2025 27.5  13.0 - 44.0 % Final    Monocytes % 03/19/2025 5.3  2.0 - 10.0 % Final    Eosinophils % 03/19/2025 8.3  0.0 - 6.0 % Final    Basophils % 03/19/2025 1.1  0.0 - 2.0 % Final    Neutrophils Absolute 03/19/2025 3.68  1.20 - 7.70 x10*3/uL Final    Percent differential counts (%) should be interpreted in the context of the absolute cell counts (cells/uL).    Immature Granulocytes Absolute, Au* 03/19/2025 0.02  0.00 - 0.70 x10*3/uL Final    Lymphocytes Absolute 03/19/2025 1.76  1.20 - 4.80 x10*3/uL Final    Monocytes Absolute 03/19/2025 0.34  0.10 - 1.00 x10*3/uL Final    Eosinophils Absolute 03/19/2025 0.53  0.00 - 0.70 x10*3/uL Final    Basophils Absolute 03/19/2025 0.07  0.00 - 0.10 x10*3/uL Final    Magnesium 03/19/2025 2.01  1.60 - 2.40 mg/dL Final    Thyroid Stimulating Hormone 03/19/2025 2.63  0.44 - 3.98 mIU/L Final    Uric Acid 03/19/2025 4.1  2.3 - 6.7 mg/dL Final    Venipuncture immediately after or during the administration of Metamizole may lead to falsely low results. Testing should be performed immediately  prior to Metamizole dosing.    Vitamin B12 03/19/2025 821  211 - 911 pg/mL Final    Vitamin D, 25-Hydroxy, Total 03/19/2025 32  30 - 100 ng/mL Final     Glucose 03/19/2025 80  74 - 99 mg/dL Final    Sodium 03/19/2025 137  136 - 145 mmol/L Final    Potassium 03/19/2025 4.4  3.5 - 5.3 mmol/L Final    Chloride 03/19/2025 103  98 - 107 mmol/L Final    Bicarbonate 03/19/2025 24  21 - 32 mmol/L Final    Anion Gap 03/19/2025 14  10 - 20 mmol/L Final    Urea Nitrogen 03/19/2025 11  6 - 23 mg/dL Final    Creatinine 03/19/2025 0.74  0.50 - 1.05 mg/dL Final    eGFR 03/19/2025 >90  >60 mL/min/1.73m*2 Final    Calculations of estimated GFR are performed using the 2021 CKD-EPI Study Refit equation without the race variable for the IDMS-Traceable creatinine methods.  https://jasn.asnjournals.org/content/early/2021/09/22/ASN.9777446367    Calcium 03/19/2025 9.2  8.6 - 10.3 mg/dL Final    Albumin 03/19/2025 4.6  3.4 - 5.0 g/dL Final    Alkaline Phosphatase 03/19/2025 47  33 - 110 U/L Final    Total Protein 03/19/2025 7.7  6.4 - 8.2 g/dL Final    AST 03/19/2025 12  9 - 39 U/L Final    Bilirubin, Total 03/19/2025 0.5  0.0 - 1.2 mg/dL Final    ALT 03/19/2025 8  7 - 45 U/L Final    Patients treated with Sulfasalazine may generate falsely decreased results for ALT.    CRP, High Sensitivity 03/19/2025 1.0 (H)  <1.0 mg/L Final    Ferritin 03/19/2025 29  8 - 150 ng/mL Final    Hemoglobin A1C 03/19/2025 4.8  See comment % Final    Estimated Average Glucose 03/19/2025 91  Not Established mg/dL Final    Insulin, Fasting 03/19/2025 7  3 - 25 uIU/mL Final    Iron 03/19/2025 63  35 - 150 ug/dL Final    UIBC 03/19/2025 306  110 - 370 ug/dL Final    TIBC 03/19/2025 369  240 - 445 ug/dL Final    % Saturation 03/19/2025 17 (L)  25 - 45 % Final    Cholesterol 03/19/2025 163  0 - 199 mg/dL Final          Age      Desirable   Borderline High   High     0-19 Y     0 - 169       170 - 199     >/= 200    20-24 Y     0 - 189       190 - 224     >/= 225         >24 Y     0 - 199       200 - 239     >/= 240   **All ranges are based on fasting samples. Specific   therapeutic targets will vary based on  patient-specific   cardiac risk.    Pediatric guidelines reference:Pediatrics 2011, 128(S5).Adult guidelines reference: NCEP ATPIII Guidelines,LINDA 2001, 258:2486-97    Venipuncture immediately after or during the administration of Metamizole may lead to falsely low results. Testing should be performed immediately prior to Metamizole dosing.    HDL-Cholesterol 03/19/2025 65.8  mg/dL Final      Age       Very Low   Low     Normal    High    0-19 Y    < 35      < 40     40-45     ----  20-24 Y    ----     < 40      >45      ----        >24 Y      ----     < 40     40-60      >60      Cholesterol/HDL Ratio 03/19/2025 2.5   Final      Ref Values  Desirable  < 3.4  High Risk  > 5.0    LDL Calculated 03/19/2025 88  <=99 mg/dL Final                                Near   Borderline      AGE      Desirable  Optimal    High     High     Very High     0-19 Y     0 - 109     ---    110-129   >/= 130     ----    20-24 Y     0 - 119     ---    120-159   >/= 160     ----      >24 Y     0 -  99   100-129  130-159   160-189     >/=190      VLDL 03/19/2025 9  0 - 40 mg/dL Final    Triglycerides 03/19/2025 47  0 - 149 mg/dL Final    Age              Desirable        Borderline         High        Very High  SEX:B           mg/dL             mg/dL               mg/dL      mg/dL  <=14D                       ----               ----        ----  15D-365D                    ----               ----        ----  1Y-9Y           0-74               75-99             >=100       ----  10Y-19Y        0-89                            >=130       ----  20Y-24Y        0-114             115-149             >=150      ----  >= 25Y         0-149             150-199             200-499    >=500      Venipuncture immediately after or during the administration of Metamizole may lead to falsely low results. Testing should be performed immediately prior to Metamizole dosing.    Non HDL Cholesterol 03/19/2025 97  0 - 149 mg/dL Final           Age       Desirable   Borderline High   High     Very High     0-19 Y     0 - 119       120 - 144     >/= 145    >/= 160    20-24 Y     0 - 149       150 - 189     >/= 190      ----         >24 Y    30 mg/dL above LDL Cholesterol goal                                   Assessment/Plan       Discussion/Summary  In summary     Notable lab work     Total cholesterol  163 desired range less than 200  HDL which is the good cholesterol  65.8  LDL    88  desired range less than 100  Triglycerides  47 with desired range less than 150    The remainder of blood work was all acceptable        Ancillary studies   Test is normal  Bone density is normal  Hearing screen is normal      Recommendations     It was wonderful to see you again  I do think you are very healthy and her engaging in healthy lifestyle and choices.  I know you are very active but I do recommend more concentrated routine regular exercise  Routine regular exercise is recommended. American Cardiology Association recommended 100-120 mins weekly of aerobic exercise (exercise that increases your heart rate). In addition you should add resistance training (lifting weights/etc.).    From the chronic back pain standpoint/sciatica I do recommend physical therapy or some easy stretching exercises to do daily as I know you are aware back pain tends to be more of a chronic issue  Consider screening mammogram as recommendations are to start at age 40 and since you are trying to conceive it could be a while before you get your baseline mammogram.  Otherwise I think stress management is probably her biggest health issue.  Continue with counseling.  Continue to use stress reduction techniques as well    I look forward to our  continued relationship            Health maintenance    Tdap or tetanus booster every 10 years  Shingrix or shingles vaccination at age 50.  This is a series of 2 vaccinations the second vaccination is given 2 to 6 months following the first  Pneumonia  vaccination with Prevnar 20 at age 50   Colon cancer screening starting at age 45 colonoscopy or if no risks could then consider Cologuard as well    Women  Yearly mammograms starting at age 40  Pap smears every 3 to 5 years  until age 65      General Wellness Tips: Please continue with a balanced diet and a regular physical activity program for at least 150 minutes/week of moderate exercise and 30 minutes/week of resistance/weight training per week.  Try to get 6 to 8 hours of sleep per night.  Please download the calm or headspace cheryl from the Cheryl Store to assist with stress and sleep management if necessary.      I hope you found your day to be worthwhile and informative and I look forward to our continued relationship.  In conclusion,  I wish to thank you for attending the  executive health program at [facility].  I wish you and your family a safe and healthy year.    Please email me at [email or web address] or call me at [phone] if you have any questions pertaining to this report or any other medical concerns.    In good health,    Марина Vincent        This note was partially generated using the Dragon voice recognition system.  There may be some incorrect wording ,grammar, spelling or punctuation errors that were not corrected prior to committing the note to the medical record. Dictation #1  MRN:[id number]  [facility]:[id number]

## 2025-03-19 ENCOUNTER — NUTRITION (OUTPATIENT)
Dept: PRIMARY CARE | Facility: CLINIC | Age: 40
End: 2025-03-19

## 2025-03-19 ENCOUNTER — HOSPITAL ENCOUNTER (OUTPATIENT)
Dept: CARDIOLOGY | Facility: HOSPITAL | Age: 40
Discharge: HOME | End: 2025-03-19
Payer: COMMERCIAL

## 2025-03-19 ENCOUNTER — HOSPITAL ENCOUNTER (OUTPATIENT)
Dept: RADIOLOGY | Facility: HOSPITAL | Age: 40
Discharge: HOME | End: 2025-03-19
Payer: COMMERCIAL

## 2025-03-19 ENCOUNTER — APPOINTMENT (OUTPATIENT)
Dept: PRIMARY CARE | Facility: CLINIC | Age: 40
End: 2025-03-19

## 2025-03-19 VITALS
WEIGHT: 135 LBS | DIASTOLIC BLOOD PRESSURE: 68 MMHG | BODY MASS INDEX: 21.69 KG/M2 | SYSTOLIC BLOOD PRESSURE: 100 MMHG | HEIGHT: 66 IN | OXYGEN SATURATION: 99 % | HEART RATE: 88 BPM

## 2025-03-19 VITALS — WEIGHT: 135 LBS | BODY MASS INDEX: 21.69 KG/M2 | HEIGHT: 66 IN

## 2025-03-19 DIAGNOSIS — Z00.00 HEALTH MAINTENANCE EXAMINATION: Primary | ICD-10-CM

## 2025-03-19 DIAGNOSIS — Z00.00 HEALTH MAINTENANCE EXAMINATION: ICD-10-CM

## 2025-03-19 LAB
25(OH)D3 SERPL-MCNC: 32 NG/ML (ref 30–100)
ALBUMIN SERPL BCP-MCNC: 4.6 G/DL (ref 3.4–5)
ALP SERPL-CCNC: 47 U/L (ref 33–110)
ALT SERPL W P-5'-P-CCNC: 8 U/L (ref 7–45)
ANION GAP SERPL CALC-SCNC: 14 MMOL/L (ref 10–20)
AST SERPL W P-5'-P-CCNC: 12 U/L (ref 9–39)
BASOPHILS # BLD AUTO: 0.07 X10*3/UL (ref 0–0.1)
BASOPHILS NFR BLD AUTO: 1.1 %
BILIRUB SERPL-MCNC: 0.5 MG/DL (ref 0–1.2)
BUN SERPL-MCNC: 11 MG/DL (ref 6–23)
CALCIUM SERPL-MCNC: 9.2 MG/DL (ref 8.6–10.3)
CHLORIDE SERPL-SCNC: 103 MMOL/L (ref 98–107)
CHOLEST SERPL-MCNC: 163 MG/DL (ref 0–199)
CHOLESTEROL/HDL RATIO: 2.5
CO2 SERPL-SCNC: 24 MMOL/L (ref 21–32)
CREAT SERPL-MCNC: 0.74 MG/DL (ref 0.5–1.05)
CRP SERPL HS-MCNC: 1 MG/L
EGFRCR SERPLBLD CKD-EPI 2021: >90 ML/MIN/1.73M*2
EOSINOPHIL # BLD AUTO: 0.53 X10*3/UL (ref 0–0.7)
EOSINOPHIL NFR BLD AUTO: 8.3 %
ERYTHROCYTE [DISTWIDTH] IN BLOOD BY AUTOMATED COUNT: 13.8 % (ref 11.5–14.5)
EST. AVERAGE GLUCOSE BLD GHB EST-MCNC: 91 MG/DL
FERRITIN SERPL-MCNC: 29 NG/ML (ref 8–150)
GLUCOSE SERPL-MCNC: 80 MG/DL (ref 74–99)
HBA1C MFR BLD: 4.8 %
HCT VFR BLD AUTO: 41.1 % (ref 36–46)
HDLC SERPL-MCNC: 65.8 MG/DL
HGB BLD-MCNC: 14.1 G/DL (ref 12–16)
IMM GRANULOCYTES # BLD AUTO: 0.02 X10*3/UL (ref 0–0.7)
IMM GRANULOCYTES NFR BLD AUTO: 0.3 % (ref 0–0.9)
INSULIN P FAST SERPL-ACNC: 7 UIU/ML (ref 3–25)
IRON SATN MFR SERPL: 17 % (ref 25–45)
IRON SERPL-MCNC: 63 UG/DL (ref 35–150)
LDLC SERPL CALC-MCNC: 88 MG/DL
LYMPHOCYTES # BLD AUTO: 1.76 X10*3/UL (ref 1.2–4.8)
LYMPHOCYTES NFR BLD AUTO: 27.5 %
MAGNESIUM SERPL-MCNC: 2.01 MG/DL (ref 1.6–2.4)
MCH RBC QN AUTO: 29 PG (ref 26–34)
MCHC RBC AUTO-ENTMCNC: 34.3 G/DL (ref 32–36)
MCV RBC AUTO: 85 FL (ref 80–100)
MONOCYTES # BLD AUTO: 0.34 X10*3/UL (ref 0.1–1)
MONOCYTES NFR BLD AUTO: 5.3 %
NEUTROPHILS # BLD AUTO: 3.68 X10*3/UL (ref 1.2–7.7)
NEUTROPHILS NFR BLD AUTO: 57.5 %
NON HDL CHOLESTEROL: 97 MG/DL (ref 0–149)
NRBC BLD-RTO: 0 /100 WBCS (ref 0–0)
PLATELET # BLD AUTO: 316 X10*3/UL (ref 150–450)
POC APPEARANCE, URINE: CLEAR
POC BILIRUBIN, URINE: NEGATIVE
POC BLOOD, URINE: NEGATIVE
POC COLOR, URINE: YELLOW
POC GLUCOSE, URINE: NEGATIVE MG/DL
POC KETONES, URINE: NEGATIVE MG/DL
POC LEUKOCYTES, URINE: NEGATIVE
POC NITRITE,URINE: NEGATIVE
POC PH, URINE: 6.5 PH
POC PROTEIN, URINE: NEGATIVE MG/DL
POC SPECIFIC GRAVITY, URINE: <=1.005
POC UROBILINOGEN, URINE: 0.2 EU/DL
POTASSIUM SERPL-SCNC: 4.4 MMOL/L (ref 3.5–5.3)
PROT SERPL-MCNC: 7.7 G/DL (ref 6.4–8.2)
RBC # BLD AUTO: 4.86 X10*6/UL (ref 4–5.2)
SODIUM SERPL-SCNC: 137 MMOL/L (ref 136–145)
TIBC SERPL-MCNC: 369 UG/DL (ref 240–445)
TRIGL SERPL-MCNC: 47 MG/DL (ref 0–149)
TSH SERPL-ACNC: 2.63 MIU/L (ref 0.44–3.98)
UIBC SERPL-MCNC: 306 UG/DL (ref 110–370)
URATE SERPL-MCNC: 4.1 MG/DL (ref 2.3–6.7)
VIT B12 SERPL-MCNC: 821 PG/ML (ref 211–911)
VLDL: 9 MG/DL (ref 0–40)
WBC # BLD AUTO: 6.4 X10*3/UL (ref 4.4–11.3)

## 2025-03-19 PROCEDURE — 80053 COMPREHEN METABOLIC PANEL: CPT

## 2025-03-19 PROCEDURE — 93016 CV STRESS TEST SUPVJ ONLY: CPT | Performed by: INTERNAL MEDICINE

## 2025-03-19 PROCEDURE — 85025 COMPLETE CBC W/AUTO DIFF WBC: CPT

## 2025-03-19 PROCEDURE — 82172 ASSAY OF APOLIPOPROTEIN: CPT

## 2025-03-19 PROCEDURE — EXAM4 EXAM 4: Performed by: INTERNAL MEDICINE

## 2025-03-19 PROCEDURE — 80061 LIPID PANEL: CPT

## 2025-03-19 PROCEDURE — 1036F TOBACCO NON-USER: CPT | Performed by: INTERNAL MEDICINE

## 2025-03-19 PROCEDURE — 83525 ASSAY OF INSULIN: CPT

## 2025-03-19 PROCEDURE — 93018 CV STRESS TEST I&R ONLY: CPT | Performed by: INTERNAL MEDICINE

## 2025-03-19 PROCEDURE — 83540 ASSAY OF IRON: CPT

## 2025-03-19 PROCEDURE — 82306 VITAMIN D 25 HYDROXY: CPT

## 2025-03-19 PROCEDURE — 83036 HEMOGLOBIN GLYCOSYLATED A1C: CPT

## 2025-03-19 PROCEDURE — 84550 ASSAY OF BLOOD/URIC ACID: CPT

## 2025-03-19 PROCEDURE — 82728 ASSAY OF FERRITIN: CPT

## 2025-03-19 PROCEDURE — 81002 URINALYSIS NONAUTO W/O SCOPE: CPT | Performed by: INTERNAL MEDICINE

## 2025-03-19 PROCEDURE — 83550 IRON BINDING TEST: CPT

## 2025-03-19 PROCEDURE — 83735 ASSAY OF MAGNESIUM: CPT

## 2025-03-19 PROCEDURE — 93017 CV STRESS TEST TRACING ONLY: CPT

## 2025-03-19 PROCEDURE — 82607 VITAMIN B-12: CPT

## 2025-03-19 PROCEDURE — 84443 ASSAY THYROID STIM HORMONE: CPT

## 2025-03-19 PROCEDURE — 86141 C-REACTIVE PROTEIN HS: CPT

## 2025-03-19 PROCEDURE — NUTCO NUTRITION CONSULTATION: Performed by: DIETITIAN, REGISTERED

## 2025-03-19 NOTE — PROGRESS NOTES
"It was a pleasure meeting Mrs. Valerie Ramirez for an annual nutrition assessment at [facility] to discuss diet and nutrition as part of her Executive Health Physical.     Nutrition Assessment    There is no problem list on file for this patient.      Nutrition History:  Food & Nutrition History:   She has a history of gestational diabetes with each of her pregnancies.  She finds it difficult to get enough protein in her meals.  She has two kids ages 2 and 4 and she is often eating quick and convenient foods which are more carbohydrate based.  She is not usually hungry for breakfast.  She tries to eat something small, but some days she does skip this meal.  She is not a big snacker.  She may occasionally snack on nuts, crackers, Kind Bars or Perfect Bars.  She is eating fish once a week.  Food Allergies: None;  Food Intolerances: None  Vitamin/mineral intake: None  Herbal supplements: None  Medication and Complementary/Alternative Medicine Use: None  GI Symptoms: GI Symptoms : has a nervous tummy, stress can lead to abdominal discomfort, diarrhea or constipation.   She typically has a bowel movement everyday.  Mouth Issues: Oral Problems: denies; Teeth Issues: Dentition : own  Sleep Habits:  tries to get 7 hours a night.  Her kids wake up throughout the night.  Her daughter has never slept through the night.      Diet Recall:  Day 1  9:00am: latte with whole milk; mini Perfect Bar  12:00pm: steak sandwich, french fries  6:00pm: fruit plate-berries, grapes, cantaloupe; cheese sandwich; 100 calorie bag of popcorn; mini Perfect bar    Day 2  9:00am: latte with whole milk  12:00pm: Chipotle burrito-veggies, cheese, brown rice, chicken  6:00pm: eggplant \"fries\" with tomato sauce, small portion of pasta with olive oil (~1 cup)    Day 3  9:00am: latte with whole milk, raisin toast  11:00am: egg, yañez, cheese on everything bagel  1:00pm: rice, chicken, broccoli in bone broth  6:00pm: small portion of pasta with " "olive oil (~1 cup), handful of cantaloupe    Food Variety: present  Oral Nutrition Supplement Use: None   Fluid Intake: water-3 glasses a day; coffee-latte with whole milk, one a day; juice-only drinks when she is sick; alcohol-none        Food Preparation:  Cooking: Patient  Grocery Shopping: Patient  Dining Out: more than 3 times a week, 3 days a week for lunch and 2-3 days a week for dinner    Physical Activity:   She is not currently doing any planned/structured exercise.  She tries to be active with her kids; takes them for walks.        Anthropometrics:  Height: 166.4 cm (5' 5.5\")   Weight: 61.2 kg (135 lb)   BMI (Calculated): 22.12               She has lost close to 10lbs over the past 3 months due to being sick off and on.  She would like to maintain her weight around 135lbs.     Weight History:   Daily Weight  03/19/25 : 61.2 kg (135 lb)  03/19/25 : 61.2 kg (135 lb)  03/06/24 : 64 kg (141 lb)  03/06/24 : 64 kg (141 lb)         Nutrition Focused Physical Exam Findings:  Subcutaneous Fat Loss:   Defer Subcutaneous Fat Loss Assessment: Defer all    Muscle Wasting:  Defer Muscle Wasting Assessment: Defer all      Nutrition Significant Labs:      Medications:  Current Outpatient Medications   Medication Instructions    Synthroid 25 mcg, oral, Daily        Estimated Needs:  Total Energy Estimated Needs in 24 hours (kCal): 1693 kCal; Method for Estimating Needs: [place] St. Jeor x 1.3 activity factor    Total Protein Estimated Needs in 24 Hours (g): 108 g; Method for Estimating 24 Hour Protein Needs: 0.8g/lb BW, 135lbs       Total Fiber Estimated Needs (g): 25 g         Nutrition Diagnosis   Malnutrition Diagnosis  Patient has Malnutrition Diagnosis: No    Nutrition Diagnosis  Patient has Nutrition Diagnosis: Yes  Diagnosis Status (1): Active  Nutrition Diagnosis 1: Inadequate fiber intake  Related to (1): food- and nutrition-related knowledge deficit regarding appropriate fiber intake  As Evidenced by (1): food " recall showing patient is not meeting the recommended 25-30g fiber per day  Additional Nutrition Diagnosis: Diagnosis 2  Diagnosis Status (2): Active  Nutrition Diagnosis 2: Inadequate protein intake  Related to (2): food- and nutrition-related knowledge deficit regarding appropriate protein intake  As Evidenced by (2): food recall showing patient is not meeting the recommended 108g protein per day       Nutrition Interventions/Recommendations   Nutrition Prescription: Oral nutrition General healthy diet, increased protein and fiber    Nutrition Interventions:   Food and Nutrient Delivery: Meals & Snacks: Fiber-modified diet, Protein-modified diet, Modification of schedule of oral intake  Goals: Eat 3 meals a day; 25g fiber per day; 108g protein per day       Nutrition Education:   Nutrition Education Content: Content related nutrition education      Nutrition Education Topics Discussed:   Increased Fiber Diet, Increased Protein Diet      Nutrition Counseling:   Nutrition Counseling Strategies : Nutrition counseling based on motivational interviewing strategy, Nutrition counseling based on goal setting strategy    Patient Goals:    1) In order to help you reach your daily protein and fiber goals consistently each day, begin to incorporate breakfast daily.    2) Aim for at least 100-110g protein per day.  Try to include at least 20-30g protein at each of your meals and include 5-10g protein at snacks.    3) Your daily fiber goal is 25-30g per day.  Be sure to include a wide variety of non-starchy vegetables with both lunch and dinner consistently (1-2 cups).    4) Choose 3 out of 5 of the following daily to help you reach your daily fiber goals:  -1 cup berries (4-5g fiber)  -1/2 cup beans (7g fiber)  -1/4 cup nuts (5g fiber)  -1/3 avocado (4g fiber)  -3 cups dark leafy greens (5g fiber)    5) Consider adding unflavored Vital Proteins collagen powder to your latte each morning.  A serving would add 18g of protein to  your day.    6) Quick and easy options for breakfast:  -OWYN pre-made protein drink  -Aloha protein bar (instead of mini Perfect bar)  -Greek yogurt + 1/2 cup Emi Crunch cereal    7) Quick and easy protein options to keep on hand to add to meals:  -pre-made cooked proteins  -Amylu chicken sausage, chicken burgers, chicken meatballs  -Kevins meals  -canned tuna, salmon, sardines    8) Aim to eat at least 2-3 servings fatty fish per week-salmon, tuna, sardines, cod, mackerel, rainbow trout, herring.      9) Recommended supplements:  -Vitamin D3         Nutrition Monitoring and Evaluation   Food and Nutrient Intake  Monitoring and Evaluation Plan: Protein intake, Fiber intake  Estimated protein intake: Estimated protein intake  Criteria: monitor patient's progress towards protein goal  Estimated fiber intake: Estimated fiber intake  Criteria: monitor patient's progress towards fiber goal                        Goal Status: Goal Status: Some progress toward goal

## 2025-03-21 LAB — LPA SERPL-MCNC: <6 MG/DL
